# Patient Record
(demographics unavailable — no encounter records)

---

## 2024-12-12 NOTE — ASSESSMENT
[Use of independent historian: [ enter independent historian's relationship to patient ] :____] : As the patient was unable to provide a complete and reliable history, I obtained clinical history from the patient's [unfilled] [FreeTextEntry1] : #AD, flaring with #xerosis discussed nature, chronicity and unpredictable course start mometasone 0.1% ointment BID to AA on body PRN roughness, SED dry skin care reviewed, handout provided. Switch to recommended products in handout and moisturize liberally.  RTC 3-6 mo , or sooner PRN  seb derm scalp - start ketoconazole shampoo 2-3x weekly PRN flaking, leave in 5-10 min prior to rinsing - has at home

## 2024-12-12 NOTE — HISTORY OF PRESENT ILLNESS
[FreeTextEntry1] : np rash [de-identified] : Referred by: Dr. Fragoso   Mr. ANGELIKA MARTINEZ  is a 7 month old M here for evaluation of below  #rash started on R arm in oct 2024 - used HC ,spread. switched to ketoconazole - using twice daily x 3 weeks with no improvement yesterday got eucrisa #mom asking about dandruff    FHx: dad and dad's family with PsO no personal or family h/o skin cancer

## 2024-12-12 NOTE — CONSULT LETTER
[Dear  ___] : Dear  [unfilled], [Consult Letter:] : I had the pleasure of evaluating your patient, [unfilled]. [Please see my note below.] : Please see my note below. [Consult Closing:] : Thank you very much for allowing me to participate in the care of this patient.  If you have any questions, please do not hesitate to contact me. [Sincerely,] : Sincerely, [FreeTextEntry3] : Carito Saldana MD Department of Dermatology [facility]

## 2024-12-12 NOTE — PHYSICAL EXAM
[Alert] : alert [Oriented x 3] : ~L oriented x 3 [FreeTextEntry3] : multiple scattered nummular plaques on trunk, extremities sparing face and diaper area xerosis mild yellow greasy scale on scalp